# Patient Record
Sex: FEMALE | Race: WHITE | NOT HISPANIC OR LATINO | Employment: FULL TIME | URBAN - METROPOLITAN AREA
[De-identification: names, ages, dates, MRNs, and addresses within clinical notes are randomized per-mention and may not be internally consistent; named-entity substitution may affect disease eponyms.]

---

## 2018-02-13 ENCOUNTER — OFFICE VISIT (OUTPATIENT)
Dept: URGENT CARE | Facility: CLINIC | Age: 23
End: 2018-02-13

## 2018-02-13 ENCOUNTER — NON-PROVIDER VISIT (OUTPATIENT)
Dept: URGENT CARE | Facility: CLINIC | Age: 23
End: 2018-02-13

## 2018-02-13 DIAGNOSIS — Z01.89 RESPIRATORY CLEARANCE EXAMINATION, ENCOUNTER FOR: ICD-10-CM

## 2018-02-13 PROCEDURE — 94010 BREATHING CAPACITY TEST: CPT | Performed by: NURSE PRACTITIONER

## 2018-02-13 PROCEDURE — 94375 RESPIRATORY FLOW VOLUME LOOP: CPT | Performed by: NURSE PRACTITIONER

## 2018-02-14 NOTE — PROGRESS NOTES
Clary Triana is a 22 y.o. female here for a non-provider visit for Mask Fit/ Respiratory Clearance    If abnormal was an in office provider notified today (if so, indicate provider)? Yes  Routed to PCP? No

## 2019-02-21 ENCOUNTER — OFFICE VISIT (OUTPATIENT)
Dept: URGENT CARE | Facility: CLINIC | Age: 24
End: 2019-02-21
Payer: COMMERCIAL

## 2019-02-21 VITALS
RESPIRATION RATE: 14 BRPM | HEART RATE: 96 BPM | WEIGHT: 156 LBS | BODY MASS INDEX: 25.07 KG/M2 | TEMPERATURE: 101.9 F | OXYGEN SATURATION: 94 % | HEIGHT: 66 IN | DIASTOLIC BLOOD PRESSURE: 68 MMHG | SYSTOLIC BLOOD PRESSURE: 112 MMHG

## 2019-02-21 DIAGNOSIS — R50.9 FEVER AND CHILLS: ICD-10-CM

## 2019-02-21 DIAGNOSIS — R11.14 BILIOUS VOMITING WITH NAUSEA: ICD-10-CM

## 2019-02-21 DIAGNOSIS — J11.1 INFLUENZA-LIKE ILLNESS: Primary | ICD-10-CM

## 2019-02-21 DIAGNOSIS — R19.7 DIARRHEA, UNSPECIFIED TYPE: ICD-10-CM

## 2019-02-21 PROCEDURE — 99214 OFFICE O/P EST MOD 30 MIN: CPT | Performed by: NURSE PRACTITIONER

## 2019-02-21 RX ORDER — ONDANSETRON 4 MG/1
4 TABLET, ORALLY DISINTEGRATING ORAL ONCE
Status: COMPLETED | OUTPATIENT
Start: 2019-02-21 | End: 2019-02-21

## 2019-02-21 RX ORDER — ONDANSETRON 4 MG/1
4 TABLET, ORALLY DISINTEGRATING ORAL EVERY 8 HOURS PRN
Qty: 10 TAB | Refills: 0 | Status: SHIPPED | OUTPATIENT
Start: 2019-02-21 | End: 2020-10-19

## 2019-02-21 RX ORDER — IBUPROFEN 200 MG
600 TABLET ORAL ONCE
Status: COMPLETED | OUTPATIENT
Start: 2019-02-21 | End: 2019-02-21

## 2019-02-21 RX ADMIN — Medication 600 MG: at 19:20

## 2019-02-21 RX ADMIN — ONDANSETRON 4 MG: 4 TABLET, ORALLY DISINTEGRATING ORAL at 19:21

## 2019-02-21 ASSESSMENT — LIFESTYLE VARIABLES: SUBSTANCE_ABUSE: 0

## 2019-02-21 ASSESSMENT — ENCOUNTER SYMPTOMS
HEARTBURN: 0
SORE THROAT: 1
BLOOD IN STOOL: 0
SHORTNESS OF BREATH: 0
CONSTIPATION: 0
SENSORY CHANGE: 0
NECK PAIN: 0
NAUSEA: 1
WHEEZING: 0
HEADACHES: 0
TINGLING: 0
VOMITING: 1
CHILLS: 1
COUGH: 0
ABDOMINAL PAIN: 0
STRIDOR: 0
DIARRHEA: 1
SINUS PAIN: 0
BACK PAIN: 1
SPUTUM PRODUCTION: 0
PALPITATIONS: 0
DIZZINESS: 0
BLURRED VISION: 0
EYE REDNESS: 0
EYE DISCHARGE: 0
FALLS: 0
FEVER: 1
MYALGIAS: 1

## 2019-02-22 NOTE — PROGRESS NOTES
"Subjective:      Clary Triana is a 23 y.o. female who presents with Emesis; Diarrhea; and Fever    Denies past medical, surgical or family history that is significant to today's problem.   RX or OTC medications-- reviewed with patient today.   No Known Allergies          HPI this is new problem.  Milagros is a 23-year-old female with complaints of nausea vomiting diarrhea and fever.  Her fever has been intermittent times 1 week.  She initially had body aches.  She has been trying to drink more fluids and treat her symptoms with over-the-counter medications.  She is having approximately 5 watery diarrhea stools a day.  No blood.  She denies dysuria or flank pain.  She has been trying to rest and drink more fluids.    Review of Systems   Constitutional: Positive for chills, fever and malaise/fatigue.   HENT: Positive for congestion, ear pain (dull ache) and sore throat. Negative for ear discharge, nosebleeds, sinus pain and tinnitus.    Eyes: Negative for blurred vision, discharge and redness.   Respiratory: Negative for cough, sputum production, shortness of breath, wheezing and stridor.    Cardiovascular: Negative for chest pain and palpitations.   Gastrointestinal: Positive for diarrhea, nausea and vomiting. Negative for abdominal pain, blood in stool, constipation and heartburn.   Genitourinary: Negative for dysuria, frequency and urgency.   Musculoskeletal: Positive for back pain and myalgias. Negative for falls, joint pain and neck pain.   Skin: Negative for rash.   Neurological: Negative for dizziness, tingling, sensory change and headaches.   Psychiatric/Behavioral: Negative for substance abuse.          Objective:     /68   Pulse 96   Temp (!) 39.2 °C (102.6 °F) (Temporal)   Resp 14   Ht 1.676 m (5' 6\")   Wt 70.8 kg (156 lb)   SpO2 94%   BMI 25.18 kg/m²      Physical Exam   Constitutional: She is oriented to person, place, and time. Vital signs are normal. She appears well-developed and well-nourished. " She is cooperative.  Non-toxic appearance. She does not have a sickly appearance. She appears ill. No distress.   HENT:   Head: Normocephalic and atraumatic.   Right Ear: Hearing, tympanic membrane, external ear and ear canal normal.   Left Ear: Hearing, tympanic membrane, external ear and ear canal normal.   Nose: Rhinorrhea present. Right sinus exhibits no maxillary sinus tenderness and no frontal sinus tenderness. Left sinus exhibits no maxillary sinus tenderness and no frontal sinus tenderness.   Mouth/Throat: Uvula is midline and mucous membranes are normal. No uvula swelling. No oropharyngeal exudate, posterior oropharyngeal edema or posterior oropharyngeal erythema. No tonsillar exudate.   Eyes: Pupils are equal, round, and reactive to light. Conjunctivae and lids are normal.   Neck: Trachea normal, normal range of motion, full passive range of motion without pain and phonation normal. Neck supple.   Cardiovascular: Normal rate, regular rhythm, normal heart sounds and normal pulses.    Pulmonary/Chest: Effort normal and breath sounds normal. No tachypnea. No respiratory distress.   Abdominal: Normal appearance and bowel sounds are normal. She exhibits no distension, no pulsatile liver, no fluid wave and no ascites. There is no tenderness. There is no rigidity, no rebound, no guarding and no CVA tenderness.   Musculoskeletal: Normal range of motion.   Lymphadenopathy:     She has no cervical adenopathy.        Right: No supraclavicular adenopathy present.        Left: No supraclavicular adenopathy present.   Neurological: She is alert and oriented to person, place, and time. She has normal strength. No cranial nerve deficit or sensory deficit. Gait (slow  gait) normal. GCS eye subscore is 4. GCS verbal subscore is 5. GCS motor subscore is 6.   Skin: Skin is warm, dry and intact. Capillary refill takes less than 2 seconds. No rash noted.   Psychiatric: She has a normal mood and affect. Her speech is normal and  behavior is normal. Judgment and thought content normal.   Nursing note and vitals reviewed.         POCT influenza: negative        Zofran ODT given in UC today for nausea    Motrin 600 mg PO given for fever.  Fever reduced at time of discharge.       Assessment/Plan:     1. Influenza-like illness     2. Bilious vomiting with nausea  ondansetron (ZOFRAN ODT) dispertab 4 mg    ondansetron (ZOFRAN ODT) 4 MG TABLET DISPERSIBLE   3. Fever and chills  ibuprofen (MOTRIN) tablet 600 mg   4. Diarrhea, unspecified type           Discussed that I felt this was viral in nature. Did not see any evidence of a bacterial process.   OTC antimotility medications of choice such as Immodium AD -Dosage and directions per   Educated in proper administration of medication(s) ordered today including safety, possible SE, risks, benefits, rationale and alternatives to therapy.   Educated in natural progression of disease with supportive care and symptomatic relief of symptoms. Educated in s/s that would need further evaluation and management in ER, UC or by PCP. Follow up prn for new or worsening symptoms.   Keep well hydrated- Increase rest  Treat fever > 101.5 with OTC ibuprofen or acetaminophen. Follow Dosage and safety directions of the .   Educated in infection control practices.   Do not return to work until fever free for 24 hours.     Patient was in agreement with this treatment plan and seemed to understand without barriers. All questions were encouraged and answered

## 2020-04-11 ENCOUNTER — OFFICE VISIT (OUTPATIENT)
Dept: URGENT CARE | Facility: PHYSICIAN GROUP | Age: 25
End: 2020-04-11
Payer: COMMERCIAL

## 2020-04-11 VITALS
BODY MASS INDEX: 21.14 KG/M2 | RESPIRATION RATE: 16 BRPM | OXYGEN SATURATION: 97 % | TEMPERATURE: 99.8 F | HEART RATE: 105 BPM | DIASTOLIC BLOOD PRESSURE: 64 MMHG | WEIGHT: 131 LBS | SYSTOLIC BLOOD PRESSURE: 112 MMHG

## 2020-04-11 DIAGNOSIS — J06.9 UPPER RESPIRATORY TRACT INFECTION, UNSPECIFIED TYPE: ICD-10-CM

## 2020-04-11 LAB
INT CON NEG: NORMAL
INT CON POS: NORMAL
S PYO AG THROAT QL: NEGATIVE

## 2020-04-11 PROCEDURE — 99213 OFFICE O/P EST LOW 20 MIN: CPT | Performed by: PHYSICIAN ASSISTANT

## 2020-04-11 PROCEDURE — 87880 STREP A ASSAY W/OPTIC: CPT | Performed by: PHYSICIAN ASSISTANT

## 2020-04-11 NOTE — LETTER
April 11, 2020         Patient: Clary Triana   YOB: 1995   Date of Visit: 4/11/2020           To Whom it May Concern:    Clary Triana was seen in my clinic on 4/11/2020.     In accordance with guidance from the Centers for Disease Control and Prevention (CDC), employer groups are encouraged to not require a doctor's note for employees who are sick with respiratory illness. [facility] and [facility] personnel will not be providing these notes for the foreseeable future.    We are unable to rule out COVID19 as testing is unavailable at this location/time. Patient may otherwise return to work 3 days after respiratory symptoms resolve/per your company's protocol.      If you have any questions or concerns, please don't hesitate to call.        Sincerely,           Bety Pitts P.A.-C.  Electronically Signed

## 2020-04-11 NOTE — LETTER
April 11, 2020         Patient: Clary Triana   YOB: 1995   Date of Visit: 4/11/2020           To Whom it May Concern:    Clary Triana was seen in my clinic on 4/11/2020.    We are unable to rule out COVID19 as testing is unavailable at this location/time. Patient may otherwise return to work 3 days after respiratory symptoms resolve/per your company's protocol.      If you have any questions or concerns, please don't hesitate to call.        Sincerely,           Bety Pitts P.A.-C.  Electronically Signed

## 2020-04-11 NOTE — PROGRESS NOTES
Chief Complaint   Patient presents with   • Sore Throat     x2d   • Ear Fullness     B ears        HISTORY OF PRESENT ILLNESS: Patient is a 24 y.o. female who presents today for the following:    ST x 2 days  + body aches, bilateral ear fullness  Denies cough, fever, chills  Travelled to [place] around 2 weeks ago; otherwise no travel outside the immediate area in the last month  No known exposure to anyone with COVID19    There are no active problems to display for this patient.      Allergies:Patient has no known allergies.    Current Outpatient Medications Ordered in Epic   Medication Sig Dispense Refill   • ondansetron (ZOFRAN ODT) 4 MG TABLET DISPERSIBLE Take 1 Tab by mouth every 8 hours as needed. 10 Tab 0     No current Epic-ordered facility-administered medications on file.        History reviewed. No pertinent past medical history.    Social History     Tobacco Use   • Smoking status: Current Every Day Smoker   • Smokeless tobacco: Never Used   Substance Use Topics   • Alcohol use: Not on file   • Drug use: Not on file       No family status information on file.   History reviewed. No pertinent family history.    Review of Systems:   Constitutional ROS: No unexpected change in weight, No weakness, No fatigue  Eye ROS: No recent significant change in vision, No eye pain, redness, discharge  Ear ROS: No drainage, No tinnitus or vertigo, No recent change in hearing  Mouth/Throat ROS: No teeth or gum problems, No bleeding gums, No tongue complaints  Neck ROS: No swollen glands, No significant pain in neck  Pulmonary ROS: No chronic cough, sputum, or hemoptysis, No dyspnea on exertion, No wheezing  Cardiovascular ROS: No diaphoresis, No edema, No palpitations  Musculoskeletal/Extremities ROS: No peripheral edema, No pain, redness or swelling on the joints  Hematologic/Lymphatic ROS: + body aches  Skin/Integumentary ROS: No edema, No evidence of rash, No itching      Exam:  /64   Pulse (!) 105   Temp 37.7 °C  (99.8 °F) (Temporal)   Resp 16   Wt 59.4 kg (131 lb)   SpO2 97%   General: Well developed, well nourished. No distress.    Eye: PERRL/EOMI; conjunctivae clear, lids normal.  ENMT: Lips without lesions, MMM. Oropharynx is clear. Bilateral TMs are within normal limits.  Pulmonary: Unlabored respiratory effort. Lungs clear to auscultation, no wheezes, no rhonchi.    Cardiovascular: Regular rate and rhythm without murmur.   Neurologic: Grossly nonfocal. No facial asymmetry noted.  Lymph: No cervical lymphadenopathy noted.  Skin: Warm, dry, good turgor. No rashes in visible areas.   Psych: Normal mood. Alert and oriented to person, place and time.    Rapid strep: Negative    Assessment/Plan:  Discussed likely viral etiology. Discussed appropriate over-the-counter symptomatic medication, and when to return to clinic. Follow up for worsening or persistent symptoms.  1. Upper respiratory tract infection, unspecified type  POCT Rapid Strep A

## 2020-10-19 ENCOUNTER — HOSPITAL ENCOUNTER (OUTPATIENT)
Facility: MEDICAL CENTER | Age: 25
End: 2020-10-19
Attending: FAMILY MEDICINE
Payer: COMMERCIAL

## 2020-10-19 ENCOUNTER — OFFICE VISIT (OUTPATIENT)
Dept: URGENT CARE | Facility: PHYSICIAN GROUP | Age: 25
End: 2020-10-19
Payer: COMMERCIAL

## 2020-10-19 VITALS
HEART RATE: 88 BPM | TEMPERATURE: 97.8 F | WEIGHT: 133.2 LBS | HEIGHT: 66 IN | OXYGEN SATURATION: 97 % | DIASTOLIC BLOOD PRESSURE: 70 MMHG | SYSTOLIC BLOOD PRESSURE: 110 MMHG | BODY MASS INDEX: 21.41 KG/M2 | RESPIRATION RATE: 14 BRPM

## 2020-10-19 DIAGNOSIS — R51.9 ACUTE NONINTRACTABLE HEADACHE, UNSPECIFIED HEADACHE TYPE: ICD-10-CM

## 2020-10-19 DIAGNOSIS — R09.81 NASAL CONGESTION: ICD-10-CM

## 2020-10-19 DIAGNOSIS — J02.9 PHARYNGITIS, UNSPECIFIED ETIOLOGY: ICD-10-CM

## 2020-10-19 DIAGNOSIS — Z20.822 EXPOSURE TO COVID-19 VIRUS: ICD-10-CM

## 2020-10-19 DIAGNOSIS — R05.9 COUGH: ICD-10-CM

## 2020-10-19 DIAGNOSIS — R50.9 FEVER, UNSPECIFIED FEVER CAUSE: ICD-10-CM

## 2020-10-19 LAB
COVID ORDER STATUS COVID19: NORMAL
SARS-COV-2 RNA RESP QL NAA+PROBE: NOTDETECTED
SPECIMEN SOURCE: NORMAL

## 2020-10-19 PROCEDURE — 99214 OFFICE O/P EST MOD 30 MIN: CPT | Mod: CS | Performed by: FAMILY MEDICINE

## 2020-10-19 PROCEDURE — U0003 INFECTIOUS AGENT DETECTION BY NUCLEIC ACID (DNA OR RNA); SEVERE ACUTE RESPIRATORY SYNDROME CORONAVIRUS 2 (SARS-COV-2) (CORONAVIRUS DISEASE [COVID-19]), AMPLIFIED PROBE TECHNIQUE, MAKING USE OF HIGH THROUGHPUT TECHNOLOGIES AS DESCRIBED BY CMS-2020-01-R: HCPCS

## 2020-10-19 NOTE — LETTER
October 19, 2020         Patient: Clary Triana   YOB: 1995   Date of Visit: 10/19/2020           To Whom it May Concern:    Clary Triana was seen in my clinic on 10/19/2020.     COVID-19 test obtained today, result usually 1-3 days.    If you have any questions or concerns, please don't hesitate to call.        Sincerely,           Xander Ogden M.D.  Electronically Signed

## 2020-10-19 NOTE — PROGRESS NOTES
Chief Complaint:    Chief Complaint   Patient presents with   • Sinusitis   • Runny Nose   • Cough     dry   • Fever     last night had a fever but came right down when she took something for it       History of Present Illness:    This is a new problem. Symptoms x 4 days; has had fever up to 102 F (no more fever today), headache, nasal symptoms with purulent mucus from nose, sore throat, and cough occl productive of purulent mucus. Overall she is improving. Using OTC meds for symptoms prn which help some temporarily. Work requested she be seen due to her symptoms.      Review of Systems:    Constitutional: See HPI.   Eyes: Negative for change in vision, photophobia, pain, redness, and discharge.  ENT: See HPI.   Respiratory: See HPI.   Cardiovascular: Negative for chest pain, palpitations, orthopnea, claudication, leg swelling, and PND.   Gastrointestinal: Negative for abdominal pain, nausea, vomiting, diarrhea, constipation, blood in stool, and melena.   Genitourinary: Negative for dysuria, urinary urgency, urinary frequency, hematuria, and flank pain.   Musculoskeletal: Negative for myalgias, joint pain, neck pain, and back pain.   Skin: Negative for rash and itching.   Neurological: Negative for dizziness, tingling, tremors, sensory change, speech change, focal weakness, seizures, and loss of consciousness.   Endo: Negative for polydipsia.   Heme: Does not bruise/bleed easily.   Psychiatric/Behavioral: Negative for depression, suicidal ideas, hallucinations, memory loss and substance abuse. The patient is not nervous/anxious and does not have insomnia.        Past Medical History:    History reviewed. No pertinent past medical history.    Past Surgical History:    History reviewed. No pertinent surgical history.    Social History:    Social History     Socioeconomic History   • Marital status: Single     Spouse name: Not on file   • Number of children: Not on file   • Years of education: Not on file   • Highest  "education level: Not on file   Occupational History   • Not on file   Social Needs   • Financial resource strain: Not on file   • Food insecurity     Worry: Not on file     Inability: Not on file   • Transportation needs     Medical: Not on file     Non-medical: Not on file   Tobacco Use   • Smoking status: Current Every Day Smoker   • Smokeless tobacco: Never Used   Substance and Sexual Activity   • Alcohol use: Not Currently   • Drug use: Yes     Types: Marijuana   • Sexual activity: Not on file   Lifestyle   • Physical activity     Days per week: Not on file     Minutes per session: Not on file   • Stress: Not on file   Relationships   • Social connections     Talks on phone: Not on file     Gets together: Not on file     Attends Worship service: Not on file     Active member of club or organization: Not on file     Attends meetings of clubs or organizations: Not on file     Relationship status: Not on file   • Intimate partner violence     Fear of current or ex partner: Not on file     Emotionally abused: Not on file     Physically abused: Not on file     Forced sexual activity: Not on file   Other Topics Concern   • Not on file   Social History Narrative   • Not on file     Family History:    History reviewed. No pertinent family history.    Medications:    No current outpatient medications on file prior to visit.     No current facility-administered medications on file prior to visit.      Allergies:    No Known Allergies      Vitals:    Vitals:    10/19/20 1021   BP: 110/70   Pulse: 88   Resp: 14   Temp: 36.6 °C (97.8 °F)   TempSrc: Temporal   SpO2: 97%   Weight: 60.4 kg (133 lb 3.2 oz)   Height: 1.676 m (5' 6\")       Physical Exam:    Constitutional: Vital signs reviewed. Appears well-developed and well-nourished. No acute distress.   Eyes: Sclera white, conjunctivae clear. PERRLA.  ENT: External ears normal. External auditory canals normal without discharge. TMs translucent and non-bulging. Hearing " normal. Nasal mucosa pink. Lips/teeth are normal. Oral mucosa pink and moist. Posterior pharynx: WNL.  Neck: Neck supple.   Cardiovascular: Regular rate and rhythm. No murmur.  Pulmonary/Chest: Respirations non-labored. Clear to auscultation bilaterally.  Lymph: Cervical nodes without tenderness or enlargement.  Musculoskeletal: Normal gait. Normal range of motion. No muscular atrophy or weakness.  Neurological: Alert and oriented to person, place, and time. CN 2-12 intact. Muscle tone normal. Coordination normal.   Skin: No rashes or lesions. Warm, dry, normal turgor.  Psychiatric: Normal mood and affect. Behavior is normal. Judgment and thought content normal.       Assessment / Plan:    1. Fever, unspecified fever cause  - COVID/SARS COV-2 PCR; Future    2. Acute nonintractable headache, unspecified headache type  - COVID/SARS COV-2 PCR; Future    3. Nasal congestion  - COVID/SARS COV-2 PCR; Future    4. Pharyngitis, unspecified etiology  - COVID/SARS COV-2 PCR; Future    5. Cough  - COVID/SARS COV-2 PCR; Future    6. Exposure to COVID-19 virus  - COVID/SARS COV-2 PCR; Future      Discussed with her DDX, management options, and risks, benefits, and alternatives to treatment plan agreed upon.    Patient is clinically stable.    May use OTC meds for symptoms prn.    Agreeable to COVID-19 test obtained.    Advised test result will show in MyChart.    Patient will follow-up if needed while waiting for test result.

## 2021-07-18 ENCOUNTER — OFFICE VISIT (OUTPATIENT)
Dept: URGENT CARE | Facility: PHYSICIAN GROUP | Age: 26
End: 2021-07-18
Payer: COMMERCIAL

## 2021-07-18 VITALS
DIASTOLIC BLOOD PRESSURE: 72 MMHG | HEIGHT: 66 IN | RESPIRATION RATE: 12 BRPM | BODY MASS INDEX: 23.63 KG/M2 | SYSTOLIC BLOOD PRESSURE: 108 MMHG | TEMPERATURE: 98.2 F | HEART RATE: 79 BPM | WEIGHT: 147 LBS | OXYGEN SATURATION: 98 %

## 2021-07-18 DIAGNOSIS — R11.0 NAUSEA: ICD-10-CM

## 2021-07-18 DIAGNOSIS — S61.452A CAT BITE OF LEFT HAND, INITIAL ENCOUNTER: ICD-10-CM
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
## 2021-07-18 DIAGNOSIS — L03.114 CELLULITIS OF LEFT FOREARM: ICD-10-CM

## 2021-07-18 PROCEDURE — 90471 IMMUNIZATION ADMIN: CPT | Performed by: PHYSICIAN ASSISTANT

## 2021-07-18 PROCEDURE — 90715 TDAP VACCINE 7 YRS/> IM: CPT | Performed by: PHYSICIAN ASSISTANT

## 2021-07-18 PROCEDURE — 99213 OFFICE O/P EST LOW 20 MIN: CPT | Mod: 25 | Performed by: PHYSICIAN ASSISTANT

## 2021-07-18 RX ORDER — AMOXICILLIN AND CLAVULANATE POTASSIUM 875; 125 MG/1; MG/1
1 TABLET, FILM COATED ORAL 2 TIMES DAILY
Qty: 20 TABLET | Refills: 0 | Status: SHIPPED | OUTPATIENT
Start: 2021-07-18 | End: 2021-07-28

## 2021-07-18 RX ORDER — ONDANSETRON 4 MG/1
4 TABLET, ORALLY DISINTEGRATING ORAL ONCE
Status: COMPLETED | OUTPATIENT
Start: 2021-07-18 | End: 2021-07-18

## 2021-07-18 RX ORDER — ONDANSETRON 4 MG/1
4 TABLET, ORALLY DISINTEGRATING ORAL EVERY 8 HOURS PRN
Qty: 20 TABLET | Refills: 0 | Status: SHIPPED | OUTPATIENT
Start: 2021-07-18

## 2021-07-18 RX ADMIN — ONDANSETRON 4 MG: 4 TABLET, ORALLY DISINTEGRATING ORAL at 14:21

## 2021-07-18 NOTE — PROGRESS NOTES
Chief Complaint   Patient presents with   • Cat Bite     left wrist, x4 days, scabs visible, multiple punctures   • Dizziness     x1 day       HISTORY OF PRESENT ILLNESS: Patient is a 25 y.o. female who presents today for the following:    Patient is in for evaluation of a cat bite to the left wrist. Her own cat bit her 4 days ago. She has had progressively worsening redness, tenderness of the left wrist and forearm. She started feeling dizzy today. She denies fever, chills, body aches but does feel little nauseated.    There are no problems to display for this patient.      Allergies:Patient has no known allergies.    Current Outpatient Medications Ordered in Epic   Medication Sig Dispense Refill   • amoxicillin-clavulanate (AUGMENTIN) 875-125 MG Tab Take 1 tablet by mouth 2 times a day for 10 days. 20 tablet 0   • ondansetron (ZOFRAN ODT) 4 MG TABLET DISPERSIBLE Take 1 tablet by mouth every 8 hours as needed for Nausea. 20 tablet 0     No current Epic-ordered facility-administered medications on file.       History reviewed. No pertinent past medical history.    Social History     Tobacco Use   • Smoking status: Current Every Day Smoker   • Smokeless tobacco: Never Used   Vaping Use   • Vaping Use: Some days   • Substances: Nicotine, THC, CBD   Substance Use Topics   • Alcohol use: Not Currently   • Drug use: Yes     Types: Marijuana       No family status information on file.   History reviewed. No pertinent family history.    Review of Systems:    Constitutional ROS: No unexpected change in weight, No weakness, No fatigue  Pulmonary ROS: No chronic cough, sputum, or hemoptysis, No dyspnea on exertion, No wheezing  Cardiovascular ROS: No diaphoresis, No edema, No palpitations  Musculoskeletal/Extremities ROS: Left arm pain  Hematologic/Lymphatic ROS: No chills, No night sweats, No weight loss  Skin/Integumentary ROS: No edema, No evidence of rash, No itching      Exam:  /72   Pulse 79   Temp 36.8 °C (98.2  "°F)   Resp 12   Ht 1.676 m (5' 6\")   Wt 66.7 kg (147 lb)   SpO2 98%   General: Well developed, well nourished. No distress.    HENT: Head is grossly normal.  Pulmonary: Unlabored respiratory effort.   Neurologic: Grossly nonfocal. No facial asymmetry noted.  Musculoskeletal: Multiple bites and scratches noted to the dorsum of the left wrist with associated erythema and trace edema. Erythema extends covering most of the left forearm. No erythema is noted proximal to the left elbow but does have tenderness along the bicep.  Skin: Warm, dry, good turgor. No rashes in visible areas.   Psych: Normal mood. Alert and oriented to person, place and time.    Tdap updated in clinic slight decreased  in the left hand.    Assessment/Plan:  Starting antibiotics. Patient states shots make her pass out and would like to limit injections if possible. May need to consider Rocephin injection if symptoms do not improve over the next 24 hours or if they worsen at any point. Discussed appropriate over-the-counter symptomatic medication, and when to return to clinic. Follow up for worsening or persistent symptoms.  1. Cat bite of left hand, initial encounter  amoxicillin-clavulanate (AUGMENTIN) 875-125 MG Tab    Tdap Vaccine =>8YO IM   2. Cellulitis of left forearm  amoxicillin-clavulanate (AUGMENTIN) 875-125 MG Tab   3. Nausea  ondansetron (ZOFRAN ODT) dispertab 4 mg    ondansetron (ZOFRAN ODT) 4 MG TABLET DISPERSIBLE       "

## 2021-07-18 NOTE — LETTER
July 18, 2021         Patient: Clary Triana   YOB: 1995   Date of Visit: 7/18/2021           To Whom it May Concern:    Clary Triana was seen in my clinic on 7/18/2021. She may return to work 7/19/21.    If you have any questions or concerns, please don't hesitate to call.        Sincerely,           Bety Pitts P.A.-C.  Electronically Signed

## 2021-12-14 ENCOUNTER — OFFICE VISIT (OUTPATIENT)
Dept: URGENT CARE | Facility: CLINIC | Age: 26
End: 2021-12-14
Payer: COMMERCIAL

## 2021-12-14 VITALS
TEMPERATURE: 98.6 F | WEIGHT: 154.1 LBS | HEIGHT: 66 IN | SYSTOLIC BLOOD PRESSURE: 112 MMHG | RESPIRATION RATE: 14 BRPM | DIASTOLIC BLOOD PRESSURE: 70 MMHG | BODY MASS INDEX: 24.77 KG/M2 | HEART RATE: 79 BPM | OXYGEN SATURATION: 98 %

## 2021-12-14 DIAGNOSIS — J02.9 PHARYNGITIS, UNSPECIFIED ETIOLOGY: ICD-10-CM

## 2021-12-14 DIAGNOSIS — M89.8X1 PAIN OF LEFT SCAPULA: ICD-10-CM

## 2021-12-14 DIAGNOSIS — R07.1 INSPIRATORY PAIN: ICD-10-CM

## 2021-12-14 LAB
INT CON NEG: NEGATIVE
INT CON POS: POSITIVE
S PYO AG THROAT QL: NEGATIVE

## 2021-12-14 PROCEDURE — 99213 OFFICE O/P EST LOW 20 MIN: CPT | Performed by: NURSE PRACTITIONER

## 2021-12-14 PROCEDURE — 87880 STREP A ASSAY W/OPTIC: CPT | Performed by: NURSE PRACTITIONER

## 2021-12-14 ASSESSMENT — ENCOUNTER SYMPTOMS
SORE THROAT: 1
CHILLS: 0
HEMOPTYSIS: 0
DIAPHORESIS: 0
PALPITATIONS: 0
FEVER: 0
COUGH: 0
SPUTUM PRODUCTION: 0
ORTHOPNEA: 0
SHORTNESS OF BREATH: 0
WHEEZING: 0

## 2021-12-14 NOTE — PROGRESS NOTES
"Subjective     Clary Triana is a 25 y.o. female who presents with Pharyngitis (x 3 weeks) and Shoulder Pain (lung pain x 3 weeks)            Clary comes in today with a 3 week history of pharyngitis. She reports that she has discomfort specifically at the right tonsil region and can intermittently taste bloody, purulent drainage.  She denies any fever, chills, nasal congestion, otalgia, cough, rash, weight loss or night sweats.  She reports a similar 3 week history of non-specific pain in the inferior region of the left scapula and also within the chest at what she describes as the left lung base.  Pain is aggravated by deep breathing.  She denies any palpitations, orthopnea, or history of similar symptoms.  She cannot recall any injury or strain.  She has not tried any measures to treat the symptoms.  She does not have a PCP.        Review of Systems   Constitutional: Negative for chills, diaphoresis, fever and malaise/fatigue.   HENT: Positive for sore throat. Negative for congestion and ear pain.    Respiratory: Negative for cough, hemoptysis, sputum production, shortness of breath and wheezing.    Cardiovascular: Negative for palpitations, orthopnea and leg swelling.   Musculoskeletal: Negative for joint pain.   Skin: Negative for itching and rash.     Medications, Allergies, and current problem list reviewed today in Epic         Objective     Blood Pressure 112/70   Pulse 79   Temperature 37 °C (98.6 °F) (Temporal)   Respiration 14   Height 1.676 m (5' 6\")   Weight 69.9 kg (154 lb 1.6 oz)   Oxygen Saturation 98%   Body Mass Index 24.87 kg/m²      Physical Exam  Vitals reviewed.   Constitutional:       General: She is not in acute distress.     Appearance: Normal appearance. She is well-developed. She is not ill-appearing, toxic-appearing or diaphoretic.   HENT:      Head: Normocephalic.      Right Ear: Tympanic membrane, ear canal and external ear normal. There is no impacted cerumen.      Left Ear: " Tympanic membrane, ear canal and external ear normal. There is no impacted cerumen.      Nose: Nose normal.      Mouth/Throat:      Lips: Pink.      Mouth: Mucous membranes are moist.      Palate: No mass and lesions.      Pharynx: Oropharynx is clear. Uvula midline. No pharyngeal swelling, oropharyngeal exudate, posterior oropharyngeal erythema or uvula swelling.      Tonsils: No tonsillar exudate or tonsillar abscesses. 0 on the right. 0 on the left.   Eyes:      General: No scleral icterus.        Right eye: No discharge.         Left eye: No discharge.      Conjunctiva/sclera: Conjunctivae normal.      Pupils: Pupils are equal, round, and reactive to light.   Neck:      Thyroid: No thyromegaly.      Vascular: No JVD.      Trachea: No tracheal deviation.   Cardiovascular:      Rate and Rhythm: Normal rate and regular rhythm.      Heart sounds: Normal heart sounds. No murmur heard.  No friction rub. No gallop.    Pulmonary:      Effort: Pulmonary effort is normal. No respiratory distress.      Breath sounds: Normal breath sounds. No stridor. No wheezing, rhonchi or rales.   Chest:      Chest wall: No tenderness.      Comments: No chest wall TTP.  Musculoskeletal:        Arms:       Cervical back: Neck supple. No tenderness.      Comments: Back is warm, dry, and intact with no bruising, swelling, or rash.  No midline TTP.  Generalized mild TTP of the inferior aspect of the left scapula with no crepitus.  No palpable muscle spasm.  Left shoulder ROM intact and provokes mild scapular pain, as does deep inspiration.     Lymphadenopathy:      Cervical: No cervical adenopathy.   Skin:     General: Skin is warm and dry.      Findings: No erythema or rash.   Neurological:      Mental Status: She is alert and oriented to person, place, and time.   Psychiatric:         Mood and Affect: Mood normal.             POCT rapid strep a: negative                Assessment & Plan        1. Pharyngitis, unspecified etiology  POCT  Rapid Strep A    Referral to ENT    CANCELED: Referral to ENT   2. Pain of left scapula  Referral to establish with [facility] PCP   3. Inspiratory pain  Referral to establish with [facility] PCP     Discussed exam findings with Clary.  Etiology unclear.  Uncertain if symptoms are related/causal or multi-factoral.  Differential reviewed.    OTC analgesics prn pain.  Establish with PCP and ENT as referred.  ED precautions reviewed.  She verbalized understanding of and agreed with plan of care.